# Patient Record
Sex: FEMALE | Race: WHITE | Employment: FULL TIME | URBAN - METROPOLITAN AREA
[De-identification: names, ages, dates, MRNs, and addresses within clinical notes are randomized per-mention and may not be internally consistent; named-entity substitution may affect disease eponyms.]

---

## 2019-01-11 LAB — PROLACTIN: 69.8

## 2019-01-24 ENCOUNTER — OFFICE VISIT (OUTPATIENT)
Dept: ENDOCRINOLOGY | Age: 39
End: 2019-01-24
Payer: COMMERCIAL

## 2019-01-24 VITALS
HEART RATE: 94 BPM | DIASTOLIC BLOOD PRESSURE: 81 MMHG | BODY MASS INDEX: 29.77 KG/M2 | SYSTOLIC BLOOD PRESSURE: 115 MMHG | OXYGEN SATURATION: 97 % | WEIGHT: 168 LBS | HEIGHT: 63 IN

## 2019-01-24 DIAGNOSIS — D35.2 PITUITARY ADENOMA (HCC): ICD-10-CM

## 2019-01-24 DIAGNOSIS — R79.89 PROLACTIN INCREASED: Primary | ICD-10-CM

## 2019-01-24 PROCEDURE — 99204 OFFICE O/P NEW MOD 45 MIN: CPT | Performed by: INTERNAL MEDICINE

## 2019-01-24 RX ORDER — CABERGOLINE 0.5 MG/1
0.25 TABLET ORAL
Qty: 24 TABLET | Refills: 1 | Status: SHIPPED | OUTPATIENT
Start: 2019-01-24 | End: 2020-03-11 | Stop reason: SDUPTHER

## 2019-01-24 RX ORDER — CABERGOLINE 0.5 MG/1
0.12 TABLET ORAL
COMMUNITY
End: 2019-01-24 | Stop reason: SDUPTHER

## 2019-02-15 ENCOUNTER — TELEPHONE (OUTPATIENT)
Dept: ENDOCRINOLOGY | Age: 39
End: 2019-02-15

## 2019-02-15 DIAGNOSIS — R79.89 PROLACTIN INCREASED: ICD-10-CM

## 2019-02-15 DIAGNOSIS — D35.2 PITUITARY ADENOMA (HCC): ICD-10-CM

## 2019-02-19 LAB
CORTISOL SALIVARY: 0.26 UG/DL
CORTISOL SALIVARY: 0.74 UG/DL

## 2019-02-20 LAB — CORTISOL SALIVARY: 0.05 UG/DL

## 2019-03-15 DIAGNOSIS — D35.2 PITUITARY ADENOMA (HCC): ICD-10-CM

## 2019-03-15 DIAGNOSIS — R79.89 PROLACTIN INCREASED: ICD-10-CM

## 2019-03-15 LAB
A/G RATIO: 1.7 (ref 1.1–2.2)
ALBUMIN SERPL-MCNC: 4.2 G/DL (ref 3.4–5)
ALP BLD-CCNC: 63 U/L (ref 40–129)
ALT SERPL-CCNC: 29 U/L (ref 10–40)
ANION GAP SERPL CALCULATED.3IONS-SCNC: 12 MMOL/L (ref 3–16)
AST SERPL-CCNC: 22 U/L (ref 15–37)
BILIRUB SERPL-MCNC: <0.2 MG/DL (ref 0–1)
BUN BLDV-MCNC: 11 MG/DL (ref 7–20)
CALCIUM SERPL-MCNC: 9.5 MG/DL (ref 8.3–10.6)
CHLORIDE BLD-SCNC: 102 MMOL/L (ref 99–110)
CO2: 27 MMOL/L (ref 21–32)
CREAT SERPL-MCNC: <0.5 MG/DL (ref 0.6–1.1)
GFR AFRICAN AMERICAN: >60
GFR NON-AFRICAN AMERICAN: >60
GLOBULIN: 2.5 G/DL
GLUCOSE BLD-MCNC: 98 MG/DL (ref 70–99)
POTASSIUM SERPL-SCNC: 4 MMOL/L (ref 3.5–5.1)
SODIUM BLD-SCNC: 141 MMOL/L (ref 136–145)
T3 FREE: 2.7 PG/ML (ref 2.3–4.2)
T4 FREE: 1.1 NG/DL (ref 0.9–1.8)
TOTAL PROTEIN: 6.7 G/DL (ref 6.4–8.2)
TSH SERPL DL<=0.05 MIU/L-ACNC: 2.91 UIU/ML (ref 0.27–4.2)

## 2019-03-18 LAB
IGF-1 (INSULIN-LIKE GROWTH I): 158 NG/ML (ref 80–277)
INSULIN-LIKE GROWTH FACTOR-1 Z-SCORE: 0
MISCELLANEOUS LAB TEST ORDER: NORMAL
MISCELLANEOUS LAB TEST ORDER: NORMAL

## 2019-03-21 ENCOUNTER — OFFICE VISIT (OUTPATIENT)
Dept: ENDOCRINOLOGY | Age: 39
End: 2019-03-21
Payer: COMMERCIAL

## 2019-03-21 VITALS
DIASTOLIC BLOOD PRESSURE: 80 MMHG | SYSTOLIC BLOOD PRESSURE: 115 MMHG | HEART RATE: 102 BPM | HEIGHT: 63 IN | WEIGHT: 168.2 LBS | OXYGEN SATURATION: 98 % | BODY MASS INDEX: 29.8 KG/M2

## 2019-03-21 DIAGNOSIS — E22.1 HYPERPROLACTINEMIA (HCC): ICD-10-CM

## 2019-03-21 DIAGNOSIS — R79.89 ELEVATED CORTISOL LEVEL: ICD-10-CM

## 2019-03-21 DIAGNOSIS — D35.2 PITUITARY ADENOMA (HCC): Primary | ICD-10-CM

## 2019-03-21 PROCEDURE — 99214 OFFICE O/P EST MOD 30 MIN: CPT | Performed by: INTERNAL MEDICINE

## 2019-04-27 LAB
CORTISOL SALIVARY: 0.03 UG/DL
CORTISOL SALIVARY: 0.03 UG/DL
CORTISOL SALIVARY: 0.07 UG/DL

## 2019-04-29 ENCOUNTER — TELEPHONE (OUTPATIENT)
Dept: ENDOCRINOLOGY | Age: 39
End: 2019-04-29

## 2019-09-09 ENCOUNTER — TELEPHONE (OUTPATIENT)
Dept: ENDOCRINOLOGY | Age: 39
End: 2019-09-09

## 2019-09-12 NOTE — TELEPHONE ENCOUNTER
LM to contact the office- need to move appointment time 9/19/2019 as Dr. Kale Aguirre will be out of the office at scheduled time

## 2019-12-23 ENCOUNTER — TELEPHONE (OUTPATIENT)
Dept: ENDOCRINOLOGY | Age: 39
End: 2019-12-23

## 2020-03-03 RX ORDER — CABERGOLINE 0.5 MG/1
0.25 TABLET ORAL
Qty: 72 TABLET | Refills: 1 | OUTPATIENT
Start: 2020-03-05

## 2020-03-04 ENCOUNTER — TELEPHONE (OUTPATIENT)
Dept: ENDOCRINOLOGY | Age: 40
End: 2020-03-04

## 2020-03-04 NOTE — TELEPHONE ENCOUNTER
PT called and left V/M stating that she was told she was unable to have her refill of Cabergoline because she hasn't been seen in the office in a while. She asked to have an appt on 3/10/20 after 11:30am. I scheduled her for 3/10/20 at 12 noon. I called the PT back and left a message of her appt and that I would send a message to the nurse requesting that once she is seen she can have her refill of Cabergoline. PT is to call back only if the appt time scheduled is not a good fit for her schedule.

## 2020-03-06 ENCOUNTER — TELEPHONE (OUTPATIENT)
Dept: ENDOCRINOLOGY | Age: 40
End: 2020-03-06

## 2020-03-11 ENCOUNTER — OFFICE VISIT (OUTPATIENT)
Dept: ENDOCRINOLOGY | Age: 40
End: 2020-03-11
Payer: COMMERCIAL

## 2020-03-11 ENCOUNTER — TELEPHONE (OUTPATIENT)
Dept: ENDOCRINOLOGY | Age: 40
End: 2020-03-11

## 2020-03-11 VITALS
WEIGHT: 166 LBS | BODY MASS INDEX: 29.41 KG/M2 | DIASTOLIC BLOOD PRESSURE: 78 MMHG | SYSTOLIC BLOOD PRESSURE: 118 MMHG | HEIGHT: 63 IN | HEART RATE: 80 BPM | OXYGEN SATURATION: 99 %

## 2020-03-11 DIAGNOSIS — E22.1 HYPERPROLACTINEMIA (HCC): ICD-10-CM

## 2020-03-11 DIAGNOSIS — D35.2 PITUITARY ADENOMA (HCC): ICD-10-CM

## 2020-03-11 DIAGNOSIS — R79.89 ELEVATED CORTISOL LEVEL: ICD-10-CM

## 2020-03-11 PROCEDURE — 99214 OFFICE O/P EST MOD 30 MIN: CPT | Performed by: INTERNAL MEDICINE

## 2020-03-11 RX ORDER — CABERGOLINE 0.5 MG/1
0.25 TABLET ORAL
Qty: 24 TABLET | Refills: 1 | Status: CANCELLED | OUTPATIENT
Start: 2020-03-12

## 2020-03-11 RX ORDER — CABERGOLINE 0.5 MG/1
0.25 TABLET ORAL
Qty: 16 TABLET | Refills: 1 | Status: SHIPPED | OUTPATIENT
Start: 2020-03-12 | End: 2020-10-07

## 2020-03-12 LAB
A/G RATIO: 1.8 (ref 1.1–2.2)
ALBUMIN SERPL-MCNC: 4.4 G/DL (ref 3.4–5)
ALP BLD-CCNC: 49 U/L (ref 40–129)
ALT SERPL-CCNC: 15 U/L (ref 10–40)
ANION GAP SERPL CALCULATED.3IONS-SCNC: 10 MMOL/L (ref 3–16)
AST SERPL-CCNC: 15 U/L (ref 15–37)
BILIRUB SERPL-MCNC: 0.3 MG/DL (ref 0–1)
BUN BLDV-MCNC: 11 MG/DL (ref 7–20)
CALCIUM SERPL-MCNC: 9.9 MG/DL (ref 8.3–10.6)
CHLORIDE BLD-SCNC: 102 MMOL/L (ref 99–110)
CO2: 28 MMOL/L (ref 21–32)
CREAT SERPL-MCNC: 0.7 MG/DL (ref 0.6–1.1)
GFR AFRICAN AMERICAN: >60
GFR NON-AFRICAN AMERICAN: >60
GLOBULIN: 2.5 G/DL
GLUCOSE BLD-MCNC: 111 MG/DL (ref 70–99)
POTASSIUM SERPL-SCNC: 4.2 MMOL/L (ref 3.5–5.1)
PROLACTIN: 13.7 NG/ML
SODIUM BLD-SCNC: 140 MMOL/L (ref 136–145)
TOTAL PROTEIN: 6.9 G/DL (ref 6.4–8.2)

## 2020-03-13 LAB — ADRENOCORTICOTROPIC HORMONE: 33 PG/ML (ref 6–58)

## 2020-09-23 ENCOUNTER — HOSPITAL ENCOUNTER (OUTPATIENT)
Dept: MRI IMAGING | Age: 40
Discharge: HOME | End: 2020-09-23
Payer: COMMERCIAL

## 2020-09-23 PROCEDURE — 70553 MRI BRAIN STEM W/O & W/DYE: CPT

## 2020-09-23 PROCEDURE — A9579 GAD-BASE MR CONTRAST NOS,1ML: HCPCS | Performed by: INTERNAL MEDICINE

## 2020-09-23 PROCEDURE — 6360000004 HC RX CONTRAST MEDICATION: Performed by: INTERNAL MEDICINE

## 2020-09-23 RX ADMIN — GADOTERIDOL 15 ML: 279.3 INJECTION, SOLUTION INTRAVENOUS at 09:20

## 2020-09-23 NOTE — RESULT ENCOUNTER NOTE
Please schedule a follow to discuss MRI results     MRI pituitary gland, Sep 2020:  No evidence of sellar suprasellar mass.

## 2020-10-07 ENCOUNTER — OFFICE VISIT (OUTPATIENT)
Dept: ENDOCRINOLOGY | Age: 40
End: 2020-10-07
Payer: COMMERCIAL

## 2020-10-07 VITALS
OXYGEN SATURATION: 98 % | DIASTOLIC BLOOD PRESSURE: 80 MMHG | SYSTOLIC BLOOD PRESSURE: 128 MMHG | BODY MASS INDEX: 29.23 KG/M2 | HEART RATE: 107 BPM | TEMPERATURE: 97.9 F | HEIGHT: 63 IN | WEIGHT: 165 LBS | RESPIRATION RATE: 14 BRPM

## 2020-10-07 PROCEDURE — 99214 OFFICE O/P EST MOD 30 MIN: CPT | Performed by: INTERNAL MEDICINE

## 2020-10-07 RX ORDER — FLUVOXAMINE MALEATE 50 MG/1
TABLET, COATED ORAL
COMMUNITY
Start: 2020-09-27

## 2020-10-07 RX ORDER — HYDROXYZINE 50 MG/1
TABLET, FILM COATED ORAL
COMMUNITY
Start: 2020-08-31

## 2020-10-07 NOTE — PROGRESS NOTES
.    Patient ID:   Susan Dumas is a 42 y.o. female    Chief Complaint:   Susan Dumas is seen meg evaluation of elevated prolactin levels, pituitary adenoma. .   OBGYN: DR. Taco Mendoza   Subjective:     MRI Oct 2010, 4mm pituitary adenoma, and prolactin levels of 86. Lh, FSH, estradiol, am cortisol normal. She was treated for sometime. Prolactin 69.8 Jan 2019. Was off treatment for atleast a year  Prolactin 17.6 in March on Cabergoline 0.25mg twice weekly. Cabergoline 0.25mg twice a week from March till Aug 2020. Out for about 2 months. She tolerates well except next morning headache, Aleve works enough   She denies any impulse disease   No plans to conceive     Adopted two boys. Never had pregnancy. Cycles regular. No nipple discharge   Denies Breast exam/Mammogram/ Nipple piercing     Not on antipsychotics, estrogen supplements, antiemetics, Ca channel blockers, methyldopa, opioids  Denies drugs (heroine)     No family history of endocrine tumors   No family history of thyroid disease     The following portions of the patient's history were reviewed and updated as appropriate:        Family History   Problem Relation Age of Onset    Breast Cancer Maternal Grandmother     Cancer Paternal Grandfather          Social History     Socioeconomic History    Marital status:      Spouse name: Not on file    Number of children: Not on file    Years of education: Not on file    Highest education level: Not on file   Occupational History    Not on file   Social Needs    Financial resource strain: Not on file    Food insecurity     Worry: Not on file     Inability: Not on file    Transportation needs     Medical: Not on file     Non-medical: Not on file   Tobacco Use    Smoking status: Former Smoker     Years: 0.00     Last attempt to quit: 2017     Years since quitting: 3.7    Smokeless tobacco: Never Used   Substance and Sexual Activity    Alcohol use:  Yes    Drug use: No    Hematological/ Lymph nodes: Negative for adenopathy. Does not bruise/bleed easily. Psychiatric/Behavioral: Negative for suicidal ideas, depression, anxiety, sleep disturbance and decreased concentration. Objective:   Physical Exam:  /80   Pulse 107   Temp 97.9 °F (36.6 °C)   Resp 14   Ht 5' 3\" (1.6 m)   Wt 165 lb (74.8 kg)   LMP 10/07/2020   SpO2 98%   BMI 29.23 kg/m²   Constitutional: Patient is oriented to person, place, and time. Patient appears well-developed and well-nourished. HENT:    Head: Normocephalic and atraumatic. Eyes: Conjunctivae and EOM are normal.     Neck: Normal range of motion. Thyroid exam normal.   Cardiovascular: Normal rate, regular rhythm and normal heart sounds. Pulmonary/Chest: Effort normal and breath sounds normal.   Abdominal: Soft. Bowel sounds are normal.   Musculoskeletal: Normal range of motion. _  Neurological: Patient is alert and oriented to person, place, and time. Patient has normal reflexes. Skin: Skin is warm and dry. Psychiatric: Patient has a normal mood and affect.  Patient behavior is normal.     Lab Review:    Orders Only on 03/11/2020   Component Date Value Ref Range Status    Prolactin 03/11/2020 13.7  ng/mL Final    Sodium 03/11/2020 140  136 - 145 mmol/L Final    Potassium 03/11/2020 4.2  3.5 - 5.1 mmol/L Final    Chloride 03/11/2020 102  99 - 110 mmol/L Final    CO2 03/11/2020 28  21 - 32 mmol/L Final    Anion Gap 03/11/2020 10  3 - 16 Final    Glucose 03/11/2020 111* 70 - 99 mg/dL Final    BUN 03/11/2020 11  7 - 20 mg/dL Final    CREATININE 03/11/2020 0.7  0.6 - 1.1 mg/dL Final    GFR Non- 03/11/2020 >60  >60 Final    GFR  03/11/2020 >60  >60 Final    Calcium 03/11/2020 9.9  8.3 - 10.6 mg/dL Final    Total Protein 03/11/2020 6.9  6.4 - 8.2 g/dL Final    Alb 03/11/2020 4.4  3.4 - 5.0 g/dL Final    Albumin/Globulin Ratio 03/11/2020 1.8  1.1 - 2.2 Final    Total Bilirubin 03/11/2020 0.3  0.0 - 1.0 mg/dL Final    Alkaline Phosphatase 03/11/2020 49  40 - 129 U/L Final    ALT 03/11/2020 15  10 - 40 U/L Final    AST 03/11/2020 15  15 - 37 U/L Final    Globulin 03/11/2020 2.5  g/dL Final    ACTH 03/11/2020 33  6 - 58 pg/mL Final           Assessment and Plan     Elease Setting was seen today for follow-up. Diagnoses and all orders for this visit:    Prolactin increased  -     Prolactin; Future  -     Miscellaneous Sendout 1; Future    Pituitary adenoma (HCC)    Elevated cortisol level        1: Hyperprolactinemia and pituitary adenoma      Work up normal for CCP, TSH, FT4, FT3, IGF-1 March 2019   MRI Feb 2019: Stable 4 mm pituitary microadenoma within the mid to left half of the gland. MRI pituitary gland, Sep 2020:  No evidence of sellar suprasellar mass.      Imaging is showing no tumor now so we can keep cabergoline off     Will like to confirm low prolactin levels     She is on fluvoxamine that can cause high prolactin     2: High saliva cortisol   Weight stable, normal appetite   No stretch marks   2 out of three are high - she did them at different times of the day - likely unreliable   Repeat levels normal April 2019     If prolactin is normal , RTC in one year with MRI and prolactin       Electronically signed by Denia Dalton MD on 10/7/2020 at 3:27 PM

## 2020-10-29 DIAGNOSIS — R79.89 PROLACTIN INCREASED: ICD-10-CM

## 2020-10-29 LAB — PROLACTIN: 19 NG/ML

## 2020-10-31 LAB — MISCELLANEOUS LAB TEST ORDER: NORMAL
